# Patient Record
Sex: FEMALE | Race: WHITE | HISPANIC OR LATINO
[De-identification: names, ages, dates, MRNs, and addresses within clinical notes are randomized per-mention and may not be internally consistent; named-entity substitution may affect disease eponyms.]

---
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
## 2021-01-19 NOTE — PATIENT PROFILE PEDIATRIC. - PURPOSEFUL PROACTIVE ROUNDING
[Patient/Parent

## 2022-04-19 NOTE — H&P PST PEDIATRIC - ASSESSMENT
partnered
16 yo girl with history of failed healing of spinal fracture and spinal fusion. History of PVCs and RBBB. Strong family history of Hypertrophic cardiomyopathy. Awaiting final clearance by cardiology. (Awaiting Halter results). PFTs done last year wnl.

## 2022-05-16 NOTE — ASU PATIENT PROFILE, PEDIATRIC - AS SC BRADEN MOBILITY
Patient has been scheduled to see  on 5/19/22.  
Tani is returning Nanci's call, Nanci was busy at the moment., but Tani stated that he can be reached anytime today at [phone]  
(4) no limitation

## 2023-11-06 NOTE — H&P PEDIATRIC - NSHPLANGLIMITEDENGLISH_GEN_A_CORE
No
Detail Level: Detailed
Quality 431: Preventive Care And Screening: Unhealthy Alcohol Use - Screening: Patient not identified as an unhealthy alcohol user when screened for unhealthy alcohol use using a systematic screening method
Quality 226: Preventive Care And Screening: Tobacco Use: Screening And Cessation Intervention: Patient screened for tobacco use and is an ex/non-smoker

## 2024-08-09 NOTE — PRE-OP CHECKLIST, PEDIATRIC - HEIGHT/LENGTH IN CM
Layla Lynn Fritsch is a 6 y.o. female who presents for Rash (Pt with mom sick visit 6 yrs old full body rash focus left under arm tiny red bumps ).      HPI    Was at Aunts for weekend  swimming  pool no hot hub   Noticed Sunday under broderick left side mildly itchy  sleeping fine no other symptoms     Now on back and legs     rough and bumpy    None on chest         Objective   /60   Pulse 86   Temp 37.1 °C (98.7 °F)   Wt (!) 25.8 kg Comment: 56.8 lbs      Physical Exam  General: Well-developed, well-nourished, alert  no acute distress.  Eyes: Normal sclera, PERRLA, EOMI.  Neuro: Symmetric face, no ataxia, grossly normal strength.  Lymph: No lymphadenopathy.  Orthopedic :normal gait.  Skin: scattered pinkinsh maculoapular rash  armpit  left side shins     faint    no vesicles or pustules         Assessment/Plan   Problem List Items Addressed This Visit    None  Visit Diagnoses       Rash    -  Primary            Patient Instructions   Use a fragrance free cream  ( Cetaphil, Cereve, Eucerin, Aquaphor, White Petroleum jelly, Aveeno)  for moisture at least twice per day and especially after bathing on damp skin.   You can do some Hydrocortisone Cream on the dry irritated patches themselves.      Return to the office if not improving or getting worse.             156.1

## 2025-01-05 NOTE — PROGRESS NOTE PEDS - SUBJECTIVE AND OBJECTIVE BOX
POST OP NOTE    S: patient seen and examined  no acute events, no pain, feeling tired    O: NAD AOx3    BL LE 5/5 FHL EHL TA GSC Q H IP  SILT l4-s1 shortness of breath

## 2025-06-21 NOTE — H&P PST PEDIATRIC - HEENT
Name band; negative Red reflex intact/Extra occular movements intact/External ear normal/Normal oropharynx/Nasal mucosa normal/Normal dentition/Normal tympanic membranes/PERRLA/No drainage